# Patient Record
(demographics unavailable — no encounter records)

---

## 2025-01-03 NOTE — DISCUSSION/SUMMARY
[de-identified] : A discussion regarding available pain management treatment options occurred with the patient. These included interventional, rehabilitative, pharmacological, and alternative modalities. We will proceed with the following:  Interventional treatment options: - Potential treatment options such as further conservative therapy, injections, and surgery were briefly discussed.   Rehabilitative options: - c/w participation in active HEP as discussed.  Medications:  -Renewed gabapentin 300mg TID for a duration of 4 weeks.  - renewed Meloxicam 7.5mg daily for 4 weeks. Discussed risks and benefits. Avoid taking for any side effects. - Patient is aware to take for 2 weeks straight than take 1 week off before repeating.  Disability status: Patient is 75% temporarily disabled.  Follow up in 3 months for reassessment.   I, Joaquina Grissom, attest that this documentation has been prepared under the direction and in the presence of Provider Drew Flores, DO The documentation recorded by the scribe, in my presence, accurately reflects the service I personally performed, and the decisions made by me with my edits as appropriate.  Best Regards, Drew Flores D.O.

## 2025-01-03 NOTE — HISTORY OF PRESENT ILLNESS
[FreeTextEntry1] : PRESENTING TODAY 01-: Last seen on 09/27/2024 and since then there has been no new complaints or acute changes. Patient presents to the office today for a follow up visit with no new changes in regard to his symptoms. He is most recently status post a Lumbar interlaminar (L5-S1) epidural steroid injection under fluoroscopic guidance on 01/11/24 which afforded him 50% sustained relief for a few days. He continues with some relief he noes the pain only comes on with physical activity.   Today he continues to complain of radiating pain into the right posterior thigh. Pain is dull/achy/sharp in nature. He notes stiffness in his right leg which occasionally refers into his foot with associated numbness. Patient is having minimal relief with Gabapentin 400mg 3 x a day to manage pain. He notes the pain is less intense. Pain is a 7/10 intensity.  Patient denies any bowel or bladder dysfunction, incontinence, or saddle anesthesia.